# Patient Record
Sex: FEMALE | Race: WHITE
[De-identification: names, ages, dates, MRNs, and addresses within clinical notes are randomized per-mention and may not be internally consistent; named-entity substitution may affect disease eponyms.]

---

## 2021-03-31 ENCOUNTER — HOSPITAL ENCOUNTER (EMERGENCY)
Dept: HOSPITAL 63 - ER | Age: 41
Discharge: HOME | End: 2021-03-31
Payer: COMMERCIAL

## 2021-03-31 VITALS — HEIGHT: 65 IN | WEIGHT: 172.18 LBS | BODY MASS INDEX: 28.69 KG/M2

## 2021-03-31 VITALS
DIASTOLIC BLOOD PRESSURE: 87 MMHG | SYSTOLIC BLOOD PRESSURE: 132 MMHG | DIASTOLIC BLOOD PRESSURE: 87 MMHG | DIASTOLIC BLOOD PRESSURE: 87 MMHG | SYSTOLIC BLOOD PRESSURE: 132 MMHG | SYSTOLIC BLOOD PRESSURE: 132 MMHG

## 2021-03-31 DIAGNOSIS — Z87.891: ICD-10-CM

## 2021-03-31 DIAGNOSIS — R42: Primary | ICD-10-CM

## 2021-03-31 DIAGNOSIS — E16.2: ICD-10-CM

## 2021-03-31 DIAGNOSIS — Z88.8: ICD-10-CM

## 2021-03-31 LAB
ALBUMIN SERPL-MCNC: 3.8 G/DL (ref 3.4–5)
ALBUMIN/GLOB SERPL: 0.9 {RATIO} (ref 1–1.7)
ALP SERPL-CCNC: 53 U/L (ref 46–116)
ALT SERPL-CCNC: 39 U/L (ref 14–59)
ANION GAP SERPL CALC-SCNC: 10 MMOL/L (ref 6–14)
APTT PPP: YELLOW S
AST SERPL-CCNC: 24 U/L (ref 15–37)
BACTERIA #/AREA URNS HPF: 0 /HPF
BASOPHILS # BLD AUTO: 0.1 X10^3/UL (ref 0–0.2)
BASOPHILS NFR BLD: 1 % (ref 0–3)
BILIRUB SERPL-MCNC: 0.3 MG/DL (ref 0.2–1)
BILIRUB UR QL STRIP: (no result)
BUN/CREAT SERPL: 19 (ref 6–20)
CA-I SERPL ISE-MCNC: 15 MG/DL (ref 7–20)
CALCIUM SERPL-MCNC: 9.5 MG/DL (ref 8.5–10.1)
CHLORIDE SERPL-SCNC: 102 MMOL/L (ref 98–107)
CO2 SERPL-SCNC: 27 MMOL/L (ref 21–32)
CREAT SERPL-MCNC: 0.8 MG/DL (ref 0.6–1)
EOSINOPHIL NFR BLD: 0.2 X10^3/UL (ref 0–0.7)
EOSINOPHIL NFR BLD: 2 % (ref 0–3)
ERYTHROCYTE [DISTWIDTH] IN BLOOD BY AUTOMATED COUNT: 12.7 % (ref 11.5–14.5)
FIBRINOGEN PPP-MCNC: CLEAR MG/DL
GFR SERPLBLD BASED ON 1.73 SQ M-ARVRAT: 79.9 ML/MIN
GLOBULIN SER-MCNC: 4.2 G/DL (ref 2.2–3.8)
GLUCOSE SERPL-MCNC: 99 MG/DL (ref 70–99)
GLUCOSE UR STRIP-MCNC: (no result) MG/DL
HCT VFR BLD CALC: 44.1 % (ref 36–47)
HGB BLD-MCNC: 15 G/DL (ref 12–15.5)
LYMPHOCYTES # BLD: 3.2 X10^3/UL (ref 1–4.8)
LYMPHOCYTES NFR BLD AUTO: 28 % (ref 24–48)
MAGNESIUM SERPL-MCNC: 1.9 MG/DL (ref 1.8–2.4)
MCH RBC QN AUTO: 31 PG (ref 25–35)
MCHC RBC AUTO-ENTMCNC: 34 G/DL (ref 31–37)
MCV RBC AUTO: 92 FL (ref 79–100)
MONO #: 1 X10^3/UL (ref 0–1.1)
MONOCYTES NFR BLD: 9 % (ref 0–9)
NEUT #: 7 X10^3UL (ref 1.8–7.7)
NEUTROPHILS NFR BLD AUTO: 61 % (ref 31–73)
NITRITE UR QL STRIP: (no result)
PLATELET # BLD AUTO: 297 X10^3/UL (ref 140–400)
POTASSIUM SERPL-SCNC: 4 MMOL/L (ref 3.5–5.1)
PROT SERPL-MCNC: 8 G/DL (ref 6.4–8.2)
RBC # BLD AUTO: 4.77 X10^6/UL (ref 3.5–5.4)
RBC #/AREA URNS HPF: 0 /HPF (ref 0–2)
SODIUM SERPL-SCNC: 139 MMOL/L (ref 136–145)
SP GR UR STRIP: 1.02
SQUAMOUS #/AREA URNS LPF: (no result) /LPF
UROBILINOGEN UR-MCNC: 0.2 MG/DL
WBC # BLD AUTO: 11.4 X10^3/UL (ref 4–11)
WBC #/AREA URNS HPF: 0 /HPF (ref 0–4)

## 2021-03-31 PROCEDURE — 99284 EMERGENCY DEPT VISIT MOD MDM: CPT

## 2021-03-31 PROCEDURE — 93005 ELECTROCARDIOGRAM TRACING: CPT

## 2021-03-31 PROCEDURE — 36415 COLL VENOUS BLD VENIPUNCTURE: CPT

## 2021-03-31 PROCEDURE — 83735 ASSAY OF MAGNESIUM: CPT

## 2021-03-31 PROCEDURE — 82947 ASSAY GLUCOSE BLOOD QUANT: CPT

## 2021-03-31 PROCEDURE — 80053 COMPREHEN METABOLIC PANEL: CPT

## 2021-03-31 PROCEDURE — 82553 CREATINE MB FRACTION: CPT

## 2021-03-31 PROCEDURE — 85025 COMPLETE CBC W/AUTO DIFF WBC: CPT

## 2021-03-31 PROCEDURE — 81001 URINALYSIS AUTO W/SCOPE: CPT

## 2021-03-31 PROCEDURE — 84484 ASSAY OF TROPONIN QUANT: CPT

## 2021-03-31 NOTE — EKG
[facility] [address], [place] [address]

Test Date:    2021               Test Time:    19:17:27

Pat Name:     ENRIQUE ABURTO       Department:   

Patient ID:   [id number]           Room:          

Gender:       F                        Technician:   CASTRO

:          1980               Requested By: KATY THACKER

Order Number: [id number]            Reading MD:     

                                 Measurements

Intervals                              Axis          

Rate:         96                       P:            50

ID:           [id number]                      QRS:          -10

QRSD:         90                       T:            36

QT:           334                                    

QTc:          428                                    

                           Interpretive Statements

SINUS RHYTHM

LEFTWARD AXIS

OTHERWISE NORMAL ECG

RI6.02

No previous ECG available for comparison

## 2021-03-31 NOTE — PHYS DOC
Past History


Past Medical History:  Cancer


Past Surgical History:  Hip Replacement, Hysterectomy, Other


Additional Past Surgical Histo:  Right shoulder


Smoking:  Quit Less Than 1 Year


Alcohol Use:  Rarely


Drug Use:  None





General Adult


EDM:


Chief Complaint:  BLOOD SUGAR PROBLEM





HPI:


HPI:





Patient is a [age] year old [sex] who presents with []





Review of Systems:


Review of Systems:


Constitutional:  Denies fever or chills 


Eyes:  Denies change in visual acuity 


HENT:  Denies nasal congestion or sore throat 


Respiratory:  Denies cough or shortness of breath 


Cardiovascular:  Denies chest pain or edema 


GI:  Denies abdominal pain, nausea, vomiting, bloody stools or diarrhea 


: Denies dysuria 


Musculoskeletal:  Denies back pain or joint pain 


Integument:  Denies rash 


Neurologic:  Denies headache, focal weakness or sensory changes 


Endocrine:  Denies polyuria or polydipsia 


Lymphatic:  Denies swollen glands 


Psychiatric:  Denies depression or anxiety





Allergies:


Allergies:





Allergies








Coded Allergies Type Severity Reaction Last Updated Verified


 


  promethazine Allergy Intermediate  4/18/14 Yes











Physical Exam:


PE:





Constitutional: Well developed, well nourished, no acute distress, non-toxic 

appearance. []


HENT: Normocephalic, atraumatic, bilateral external ears normal, oropharynx 

moist, no oral exudates, nose normal. []


Eyes: PERRLA, EOMI, conjunctiva normal, no discharge. [] 


Neck: Normal range of motion, no tenderness, supple, no stridor. [] 


Cardiovascular:Heart rate regular rhythm, no murmur []


Lungs & Thorax:  Bilateral breath sounds clear to auscultation []


Abdomen: Bowel sounds normal, soft, no tenderness, no masses, no pulsatile 

masses. [] 


Skin: Warm, dry, no erythema, no rash. [] 


Back: No tenderness, no CVA tenderness. [] 


Extremities: No tenderness, no cyanosis, no clubbing, ROM intact, no edema. [] 


Neurologic: Alert and oriented X 3, normal motor function, normal sensory 

function, no focal deficits noted. []


Psychologic: Affect normal, judgement normal, mood normal. []





Current Patient Data:


Labs:





                                Laboratory Tests








Test


 3/31/21


18:33 3/31/21


18:54


 


Glucose (Fingerstick)


 99 mg/dL


(70-99) 





 


White Blood Count


 


 11.4 x10^3/uL


(4.0-11.0)  H


 


Red Blood Count


 


 4.77 x10^6/uL


(3.50-5.40)


 


Hemoglobin


 


 15.0 g/dL


(12.0-15.5)


 


Hematocrit


 


 44.1 %


(36.0-47.0)


 


Mean Corpuscular Volume


 


 92 fL ()





 


Mean Corpuscular Hemoglobin  31 pg (25-35)  


 


Mean Corpuscular Hemoglobin


Concent 


 34 g/dL


(31-37)


 


Red Cell Distribution Width


 


 12.7 %


(11.5-14.5)


 


Platelet Count


 


 297 x10^3/uL


(140-400)


 


Neutrophils (%) (Auto)  61 % (31-73)  


 


Lymphocytes (%) (Auto)  28 % (24-48)  


 


Monocytes (%) (Auto)  9 % (0-9)  


 


Eosinophils (%) (Auto)  2 % (0-3)  


 


Basophils (%) (Auto)  1 % (0-3)  


 


Neutrophils # (Auto)


 


 7.0 x10^3uL


(1.8-7.7)


 


Lymphocytes # (Auto)


 


 3.2 x10^3/uL


(1.0-4.8)


 


Monocytes # (Auto)


 


 1.0 x10^3/uL


(0.0-1.1)


 


Eosinophils # (Auto)


 


 0.2 x10^3/uL


(0.0-0.7)


 


Basophils # (Auto)


 


 0.1 x10^3/uL


(0.0-0.2)








Vital Signs:





                                   Vital Signs








  Date Time  Temp Pulse Resp B/P (MAP) Pulse Ox O2 Delivery O2 Flow Rate FiO2


 


3/31/21 18:34 97.8 104 16 136/94 (108) 100 Room Air  











EKG:


EKG:


@1917 NSR at 96bpm, NO ST elevation, QRS 90ms, QT/QTc 334/428ms





Radiology/Procedures:


Radiology/Procedures:


[]





Heart Score:


Risk Factors:


Risk Factors:  DM, Current or recent (<one month) smoker, HTN, HLP, family 

history of CAD, obesity.


Risk Scores:


Score 0 - 3:  2.5% MACE over next 6 weeks - Discharge Home


Score 4 - 6:  20.3% MACE over next 6 weeks - Admit for Clinical Observation


Score 7 - 10:  72.7% MACE over next 6 weeks - Early Invasive Strategies





Course & Med Decision Making:


Course & Med Decision Making


Pertinent Labs and Imaging studies reviewed. (See chart for details)





[]





Dragon Disclaimer:


Dragon Disclaimer:


This electronic medical record was generated, in whole or in part, using a voice

 recognition dictation system.





Departure


Departure:


Impression:  


   Primary Impression:  


   Dizziness


   Additional Impression:  


   Hx of hypoglycemia


Disposition:  01 DC HOME SELF CARE/HOMELESS


Condition:  STABLE


Referrals:  


PCP,NO (PCP)


Patient Instructions:  Dizziness, Easy-to-Read, Hypoglycemia (Low Blood Sugar)











KATY THACKER DO             Mar 31, 2021 19:24

## 2022-02-18 ENCOUNTER — HOSPITAL ENCOUNTER (OUTPATIENT)
Dept: HOSPITAL 63 - RAD | Age: 42
End: 2022-02-18
Attending: PHYSICIAN ASSISTANT
Payer: COMMERCIAL

## 2022-02-18 DIAGNOSIS — M79.89: Primary | ICD-10-CM

## 2022-02-18 DIAGNOSIS — M77.8: ICD-10-CM

## 2022-02-18 DIAGNOSIS — M25.572: ICD-10-CM

## 2022-02-18 PROCEDURE — 73610 X-RAY EXAM OF ANKLE: CPT

## 2022-02-18 NOTE — RAD
EXAM: Left ankle, 3 views.



HISTORY: Twisting injury. Pain.



COMPARISON: None.



FINDINGS: 3 views of the left ankle are obtained. There is no fracture, dislocation or subluxation. T
he ankle mortise is intact. There is no osteochondral lesion. There is lateral ankle soft tissue swel
ling. There is a small plantar spur. There is minimal enthesopathy at Achilles tendon insertion.



IMPRESSION: Lateral ankle soft tissue swelling. No acute osseous finding.



Electronically signed by: Sandy Horner MD (2/18/2022 3:30 PM) [id number]

## 2022-03-14 ENCOUNTER — HOSPITAL ENCOUNTER (OUTPATIENT)
Dept: HOSPITAL 63 - PMG | Age: 42
End: 2022-03-14
Payer: COMMERCIAL

## 2022-03-14 DIAGNOSIS — M19.072: ICD-10-CM
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
## 2022-03-14 DIAGNOSIS — S99.912A: Primary | ICD-10-CM
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
## 2022-03-14 DIAGNOSIS — M77.32: ICD-10-CM

## 2022-03-14 DIAGNOSIS — M79.89: ICD-10-CM
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
## 2022-03-14 PROCEDURE — 73590 X-RAY EXAM OF LOWER LEG: CPT

## 2022-03-14 PROCEDURE — 73610 X-RAY EXAM OF ANKLE: CPT

## 2022-03-14 NOTE — RAD
EXAM: Left tibia and fibula, 2 views; left ankle, 3 views.



HISTORY: Trauma. Pain.



COMPARISON: None.



FINDINGS: 2 views of the tibia and fibula and 3 views of the ankle are obtained. There is no acute fr
acture, dislocation or subluxation. There are small benign bone islands. There is no osteochondral [place]. There is soft tissue edema. There is a small plantar spur. There is minimal anterior tibial spu
rring.



IMPRESSION: 

1. No acute osseous finding.

2. 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



Electronically signed by: Sandy Horner MD (3/14/2022 2:52 PM) [id number]

## 2022-03-14 NOTE — RAD
EXAM: Left tibia and fibula, 2 views; left ankle, 3 views.



HISTORY: Trauma. Pain.



COMPARISON: None.



FINDINGS: 2 views of the tibia and fibula and 3 views of the ankle are obtained. There is no acute fr
acture, dislocation or subluxation. There are small benign bone islands. There is no osteochondral le
[place]. There is soft tissue edema. There is a small plantar spur. There is minimal anterior tibial spu
rring.



IMPRESSION: 

1. No acute osseous finding.

2. 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



Electronically signed by: Sandy Horner MD (3/14/2022 2:52 PM) [id number]